# Patient Record
Sex: MALE | Race: WHITE | URBAN - METROPOLITAN AREA
[De-identification: names, ages, dates, MRNs, and addresses within clinical notes are randomized per-mention and may not be internally consistent; named-entity substitution may affect disease eponyms.]

---

## 2022-08-04 ENCOUNTER — TELEPHONE (OUTPATIENT)
Dept: ORTHOPEDIC SURGERY | Facility: CLINIC | Age: 67
End: 2022-08-04

## 2022-08-04 NOTE — TELEPHONE ENCOUNTER
Caller: Jude Beavers    Relationship to patient: Self    Best call back number: [phone]    Patient is needing: CALLBACK FROM SARAH HERNANDEZ MA  PATIENT CALLING RE: MESSAGE ON [employer] AND REQUESTS LEAVING A MESSAGE SINCE RECEPTION SO BAD INSIDE THE [place] PLANT        UNABLE TO WARM TRANSFER